# Patient Record
Sex: MALE | Race: WHITE | NOT HISPANIC OR LATINO | Employment: OTHER | URBAN - METROPOLITAN AREA
[De-identification: names, ages, dates, MRNs, and addresses within clinical notes are randomized per-mention and may not be internally consistent; named-entity substitution may affect disease eponyms.]

---

## 2019-03-31 ENCOUNTER — HOSPITAL ENCOUNTER (OUTPATIENT)
Dept: RADIOLOGY | Facility: MEDICAL CENTER | Age: 64
End: 2019-03-31

## 2019-03-31 ENCOUNTER — HOSPITAL ENCOUNTER (OUTPATIENT)
Facility: MEDICAL CENTER | Age: 64
DRG: 247 | End: 2019-03-31
Payer: MEDICARE

## 2019-03-31 ENCOUNTER — HOSPITAL ENCOUNTER (INPATIENT)
Facility: MEDICAL CENTER | Age: 64
LOS: 2 days | DRG: 247 | End: 2019-04-03
Attending: INTERNAL MEDICINE | Admitting: INTERNAL MEDICINE
Payer: MEDICARE

## 2019-03-31 DIAGNOSIS — I21.4 NSTEMI (NON-ST ELEVATED MYOCARDIAL INFARCTION) (HCC): ICD-10-CM

## 2019-03-31 PROBLEM — I10 ESSENTIAL HYPERTENSION: Status: ACTIVE | Noted: 2019-03-31

## 2019-03-31 PROBLEM — E78.5 DYSLIPIDEMIA: Status: ACTIVE | Noted: 2019-03-31

## 2019-03-31 LAB
ERYTHROCYTE [DISTWIDTH] IN BLOOD BY AUTOMATED COUNT: 41.6 FL (ref 35.9–50)
HCT VFR BLD AUTO: 47.5 % (ref 42–52)
HGB BLD-MCNC: 15.1 G/DL (ref 14–18)
MCH RBC QN AUTO: 28.2 PG (ref 27–33)
MCHC RBC AUTO-ENTMCNC: 31.8 G/DL (ref 33.7–35.3)
MCV RBC AUTO: 88.6 FL (ref 81.4–97.8)
PLATELET # BLD AUTO: 249 K/UL (ref 164–446)
PMV BLD AUTO: 8.6 FL (ref 9–12.9)
RBC # BLD AUTO: 5.36 M/UL (ref 4.7–6.1)
TROPONIN I SERPL-MCNC: 1.89 NG/ML (ref 0–0.04)
WBC # BLD AUTO: 7.3 K/UL (ref 4.8–10.8)

## 2019-03-31 PROCEDURE — 99220 PR INITIAL OBSERVATION CARE,LEVL III: CPT | Performed by: HOSPITALIST

## 2019-03-31 PROCEDURE — G0378 HOSPITAL OBSERVATION PER HR: HCPCS

## 2019-03-31 PROCEDURE — 99204 OFFICE O/P NEW MOD 45 MIN: CPT | Performed by: INTERNAL MEDICINE

## 2019-03-31 PROCEDURE — 700102 HCHG RX REV CODE 250 W/ 637 OVERRIDE(OP): Performed by: HOSPITALIST

## 2019-03-31 PROCEDURE — A9270 NON-COVERED ITEM OR SERVICE: HCPCS | Performed by: HOSPITALIST

## 2019-03-31 PROCEDURE — 36415 COLL VENOUS BLD VENIPUNCTURE: CPT

## 2019-03-31 PROCEDURE — 84484 ASSAY OF TROPONIN QUANT: CPT

## 2019-03-31 PROCEDURE — 80048 BASIC METABOLIC PNL TOTAL CA: CPT

## 2019-03-31 PROCEDURE — 85027 COMPLETE CBC AUTOMATED: CPT

## 2019-03-31 RX ORDER — ACETAMINOPHEN 325 MG/1
650 TABLET ORAL EVERY 6 HOURS PRN
Status: DISCONTINUED | OUTPATIENT
Start: 2019-03-31 | End: 2019-04-02

## 2019-03-31 RX ORDER — ASPIRIN 300 MG/1
300 SUPPOSITORY RECTAL DAILY
Status: DISCONTINUED | OUTPATIENT
Start: 2019-04-01 | End: 2019-04-02

## 2019-03-31 RX ORDER — OXYCODONE HYDROCHLORIDE 5 MG/1
5 TABLET ORAL
Status: DISCONTINUED | OUTPATIENT
Start: 2019-03-31 | End: 2019-04-01

## 2019-03-31 RX ORDER — ASPIRIN 81 MG/1
324 TABLET, CHEWABLE ORAL DAILY
Status: DISCONTINUED | OUTPATIENT
Start: 2019-04-01 | End: 2019-04-02

## 2019-03-31 RX ORDER — ENALAPRILAT 1.25 MG/ML
1.25 INJECTION INTRAVENOUS EVERY 6 HOURS PRN
Status: DISCONTINUED | OUTPATIENT
Start: 2019-03-31 | End: 2019-04-03

## 2019-03-31 RX ORDER — ONDANSETRON 4 MG/1
4 TABLET, ORALLY DISINTEGRATING ORAL EVERY 4 HOURS PRN
Status: DISCONTINUED | OUTPATIENT
Start: 2019-03-31 | End: 2019-04-01

## 2019-03-31 RX ORDER — ONDANSETRON 2 MG/ML
4 INJECTION INTRAMUSCULAR; INTRAVENOUS EVERY 4 HOURS PRN
Status: DISCONTINUED | OUTPATIENT
Start: 2019-03-31 | End: 2019-04-01

## 2019-03-31 RX ORDER — NITROGLYCERIN 0.4 MG/1
0.4 TABLET SUBLINGUAL
Status: DISCONTINUED | OUTPATIENT
Start: 2019-03-31 | End: 2019-04-03 | Stop reason: HOSPADM

## 2019-03-31 RX ORDER — BISACODYL 10 MG
10 SUPPOSITORY, RECTAL RECTAL
Status: DISCONTINUED | OUTPATIENT
Start: 2019-03-31 | End: 2019-04-01

## 2019-03-31 RX ORDER — OXYCODONE HYDROCHLORIDE 5 MG/1
2.5 TABLET ORAL
Status: DISCONTINUED | OUTPATIENT
Start: 2019-03-31 | End: 2019-04-01

## 2019-03-31 RX ORDER — HYDROMORPHONE HYDROCHLORIDE 1 MG/ML
0.25 INJECTION, SOLUTION INTRAMUSCULAR; INTRAVENOUS; SUBCUTANEOUS
Status: DISCONTINUED | OUTPATIENT
Start: 2019-03-31 | End: 2019-04-01

## 2019-03-31 RX ORDER — POLYETHYLENE GLYCOL 3350 17 G/17G
1 POWDER, FOR SOLUTION ORAL
Status: DISCONTINUED | OUTPATIENT
Start: 2019-03-31 | End: 2019-04-01

## 2019-03-31 RX ORDER — AMOXICILLIN 250 MG
2 CAPSULE ORAL 2 TIMES DAILY
Status: DISCONTINUED | OUTPATIENT
Start: 2019-03-31 | End: 2019-04-01

## 2019-03-31 RX ORDER — ASPIRIN 325 MG
325 TABLET ORAL DAILY
Status: DISCONTINUED | OUTPATIENT
Start: 2019-04-01 | End: 2019-04-02

## 2019-03-31 RX ORDER — ATORVASTATIN CALCIUM 80 MG/1
80 TABLET, FILM COATED ORAL EVERY EVENING
Status: DISCONTINUED | OUTPATIENT
Start: 2019-03-31 | End: 2019-04-03 | Stop reason: HOSPADM

## 2019-03-31 RX ADMIN — ATORVASTATIN CALCIUM 80 MG: 80 TABLET, FILM COATED ORAL at 22:40

## 2019-03-31 RX ADMIN — METOPROLOL TARTRATE 25 MG: 25 TABLET, FILM COATED ORAL at 22:40

## 2019-03-31 ASSESSMENT — COGNITIVE AND FUNCTIONAL STATUS - GENERAL
SUGGESTED CMS G CODE MODIFIER MOBILITY: CH
SUGGESTED CMS G CODE MODIFIER DAILY ACTIVITY: CH
MOBILITY SCORE: 24
DAILY ACTIVITIY SCORE: 24

## 2019-03-31 ASSESSMENT — PATIENT HEALTH QUESTIONNAIRE - PHQ9
SUM OF ALL RESPONSES TO PHQ9 QUESTIONS 1 AND 2: 0
1. LITTLE INTEREST OR PLEASURE IN DOING THINGS: NOT AT ALL
2. FEELING DOWN, DEPRESSED, IRRITABLE, OR HOPELESS: NOT AT ALL

## 2019-03-31 ASSESSMENT — LIFESTYLE VARIABLES
EVER_SMOKED: NEVER
ALCOHOL_USE: NO

## 2019-03-31 NOTE — PROGRESS NOTES
63-year-old male who presented at outside facility at [facility] with complaints of body aches, nausea and vomiting found to have elevated troponin at 0.45 with apparently normal EKG. Dr Green discussed this patient with Dr. Marie/cardiology, who agreed to consult upon patient's arrival

## 2019-04-01 ENCOUNTER — APPOINTMENT (OUTPATIENT)
Dept: CARDIOLOGY | Facility: MEDICAL CENTER | Age: 64
DRG: 247 | End: 2019-04-01
Attending: PHYSICIAN ASSISTANT
Payer: MEDICARE

## 2019-04-01 LAB
ALBUMIN SERPL BCP-MCNC: 4.1 G/DL (ref 3.2–4.9)
ALBUMIN/GLOB SERPL: 1.5 G/DL
ALP SERPL-CCNC: 69 U/L (ref 30–99)
ALT SERPL-CCNC: 35 U/L (ref 2–50)
ANION GAP SERPL CALC-SCNC: 5 MMOL/L (ref 0–11.9)
ANION GAP SERPL CALC-SCNC: 7 MMOL/L (ref 0–11.9)
APTT PPP: 28 SEC (ref 24.7–36)
AST SERPL-CCNC: 23 U/L (ref 12–45)
BILIRUB SERPL-MCNC: 0.7 MG/DL (ref 0.1–1.5)
BUN SERPL-MCNC: 13 MG/DL (ref 8–22)
BUN SERPL-MCNC: 14 MG/DL (ref 8–22)
CALCIUM SERPL-MCNC: 9.1 MG/DL (ref 8.5–10.5)
CALCIUM SERPL-MCNC: 9.2 MG/DL (ref 8.5–10.5)
CHLORIDE SERPL-SCNC: 106 MMOL/L (ref 96–112)
CHLORIDE SERPL-SCNC: 107 MMOL/L (ref 96–112)
CHOLEST SERPL-MCNC: 212 MG/DL (ref 100–199)
CO2 SERPL-SCNC: 25 MMOL/L (ref 20–33)
CO2 SERPL-SCNC: 26 MMOL/L (ref 20–33)
CREAT SERPL-MCNC: 0.84 MG/DL (ref 0.5–1.4)
CREAT SERPL-MCNC: 0.87 MG/DL (ref 0.5–1.4)
EKG IMPRESSION: NORMAL
ERYTHROCYTE [DISTWIDTH] IN BLOOD BY AUTOMATED COUNT: 41.4 FL (ref 35.9–50)
GLOBULIN SER CALC-MCNC: 2.7 G/DL (ref 1.9–3.5)
GLUCOSE SERPL-MCNC: 102 MG/DL (ref 65–99)
GLUCOSE SERPL-MCNC: 107 MG/DL (ref 65–99)
HCT VFR BLD AUTO: 48.2 % (ref 42–52)
HDLC SERPL-MCNC: 30 MG/DL
HGB BLD-MCNC: 15.9 G/DL (ref 14–18)
INR PPP: 1.03 (ref 0.87–1.13)
LDLC SERPL CALC-MCNC: 158 MG/DL
MCH RBC QN AUTO: 29 PG (ref 27–33)
MCHC RBC AUTO-ENTMCNC: 33 G/DL (ref 33.7–35.3)
MCV RBC AUTO: 88 FL (ref 81.4–97.8)
PLATELET # BLD AUTO: 246 K/UL (ref 164–446)
PMV BLD AUTO: 8.5 FL (ref 9–12.9)
POTASSIUM SERPL-SCNC: 4.1 MMOL/L (ref 3.6–5.5)
POTASSIUM SERPL-SCNC: 4.3 MMOL/L (ref 3.6–5.5)
PROT SERPL-MCNC: 6.8 G/DL (ref 6–8.2)
PROTHROMBIN TIME: 13.6 SEC (ref 12–14.6)
RBC # BLD AUTO: 5.48 M/UL (ref 4.7–6.1)
SODIUM SERPL-SCNC: 138 MMOL/L (ref 135–145)
SODIUM SERPL-SCNC: 138 MMOL/L (ref 135–145)
TRIGL SERPL-MCNC: 120 MG/DL (ref 0–149)
TROPONIN I SERPL-MCNC: 2.52 NG/ML (ref 0–0.04)
WBC # BLD AUTO: 6.7 K/UL (ref 4.8–10.8)

## 2019-04-01 PROCEDURE — 700105 HCHG RX REV CODE 258: Performed by: INTERNAL MEDICINE

## 2019-04-01 PROCEDURE — 700102 HCHG RX REV CODE 250 W/ 637 OVERRIDE(OP): Performed by: HOSPITALIST

## 2019-04-01 PROCEDURE — 700105 HCHG RX REV CODE 258: Performed by: PHYSICIAN ASSISTANT

## 2019-04-01 PROCEDURE — 85730 THROMBOPLASTIN TIME PARTIAL: CPT

## 2019-04-01 PROCEDURE — A9270 NON-COVERED ITEM OR SERVICE: HCPCS | Performed by: HOSPITALIST

## 2019-04-01 PROCEDURE — 770020 HCHG ROOM/CARE - TELE (206)

## 2019-04-01 PROCEDURE — 85027 COMPLETE CBC AUTOMATED: CPT

## 2019-04-01 PROCEDURE — 80053 COMPREHEN METABOLIC PANEL: CPT

## 2019-04-01 PROCEDURE — 36415 COLL VENOUS BLD VENIPUNCTURE: CPT

## 2019-04-01 PROCEDURE — 85610 PROTHROMBIN TIME: CPT

## 2019-04-01 PROCEDURE — 80061 LIPID PANEL: CPT

## 2019-04-01 PROCEDURE — 99233 SBSQ HOSP IP/OBS HIGH 50: CPT | Performed by: HOSPITALIST

## 2019-04-01 PROCEDURE — 93005 ELECTROCARDIOGRAM TRACING: CPT | Performed by: INTERNAL MEDICINE

## 2019-04-01 PROCEDURE — 93010 ELECTROCARDIOGRAM REPORT: CPT | Performed by: INTERNAL MEDICINE

## 2019-04-01 RX ORDER — SODIUM CHLORIDE 9 MG/ML
INJECTION, SOLUTION INTRAVENOUS CONTINUOUS
Status: DISCONTINUED | OUTPATIENT
Start: 2019-04-01 | End: 2019-04-01

## 2019-04-01 RX ORDER — SODIUM CHLORIDE 9 MG/ML
1000 INJECTION, SOLUTION INTRAVENOUS CONTINUOUS
Status: DISCONTINUED | OUTPATIENT
Start: 2019-04-01 | End: 2019-04-02

## 2019-04-01 RX ADMIN — METOPROLOL TARTRATE 25 MG: 25 TABLET, FILM COATED ORAL at 18:09

## 2019-04-01 RX ADMIN — SODIUM CHLORIDE: 9 INJECTION, SOLUTION INTRAVENOUS at 08:10

## 2019-04-01 RX ADMIN — ATORVASTATIN CALCIUM 80 MG: 80 TABLET, FILM COATED ORAL at 18:09

## 2019-04-01 RX ADMIN — METOPROLOL TARTRATE 25 MG: 25 TABLET, FILM COATED ORAL at 06:24

## 2019-04-01 RX ADMIN — SODIUM CHLORIDE 1000 ML: 9 INJECTION, SOLUTION INTRAVENOUS at 21:00

## 2019-04-01 RX ADMIN — ASPIRIN 325 MG: 325 TABLET ORAL at 06:24

## 2019-04-01 ASSESSMENT — ENCOUNTER SYMPTOMS
COUGH: 0
DIAPHORESIS: 0
CHILLS: 0
FEVER: 0
HEADACHES: 0
PALPITATIONS: 0
DIZZINESS: 0

## 2019-04-01 NOTE — H&P
Hospital Medicine History & Physical Note    Date of Service  3/31/2019    Primary Care Physician  No primary care provider on file.    Consultants  Cardiology    Code Status  Full code    Chief Complaint  Nausea and malaise    History of Presenting Illness  63 y.o. male who presented 3/31/2019 with history of dyslipidemia and hypertension which she has been managing with lifestyle changes.  He reports that on March 26 he had an episode of pressure sensation radiating across his chest associated with nausea malaise and tingling sensation in his left arm his blood pressure was elevated when he checked it it it was 180/100.  His symptoms resolved spontaneously and his blood pressure improved so he did not seek medical care.  Over the past couple of days he has not been feeling well with vague malaise this morning his blood pressure was elevated and he had nausea so he got concerned and presented to the emergency room at [facility] in [place] where he was noted to have elevated troponin of 1.47, no acute ischemic changes on his ECG and he was subsequently transferred to our facility.  At the time of my examination the patient denies any chest pain.  He denies any orthopnea his nausea has resolved.  He denies any fever or chills.  He denies any palpitations.  He denies any loss of consciousness.  He denies any diaphoresis.  He has been exercising until his episode on March 26 and was not limited by dyspnea or chest pain.    Review of Systems  Review of Systems   All other systems reviewed and are negative.      Past Medical History  Hypertension, dyslipidemia    Surgical History  Negative per patient    Family History  His father had a heart attack at age 68    Social History  He is a lifetime non-smoker he denies alcohol or illicit drug use    Allergies  Allergies not on file    Medications  None       Physical Exam  Temp:  [36.7 °C (98.1 °F)] 36.7 °C (98.1 °F)  Pulse:  [90] 90  Resp:  [16] 16  BP: (157)/(88)  157/88  SpO2:  [96 %] 96 %    Physical Exam   Constitutional: He is oriented to person, place, and time. He appears well-developed and well-nourished.   HENT:   Head: Normocephalic and atraumatic.   Right Ear: External ear normal.   Left Ear: External ear normal.   Mouth/Throat: No oropharyngeal exudate.   Eyes: Conjunctivae are normal. Right eye exhibits no discharge. Left eye exhibits no discharge. No scleral icterus.   Neck: Neck supple. No JVD present. No tracheal deviation present.   Cardiovascular: Normal rate and regular rhythm.  Exam reveals no gallop and no friction rub.    No murmur heard.  Pulmonary/Chest: Effort normal and breath sounds normal. No stridor. No respiratory distress. He has no wheezes. He has no rales. He exhibits no tenderness.   Abdominal: Soft. Bowel sounds are normal. He exhibits no distension and no mass. There is no tenderness. There is no rebound and no guarding.   Musculoskeletal: He exhibits no edema or tenderness.   Neurological: He is alert and oriented to person, place, and time. No cranial nerve deficit. He exhibits normal muscle tone.   Skin: Skin is warm and dry. He is not diaphoretic. No cyanosis. Nails show no clubbing.   Psychiatric: He has a normal mood and affect. His behavior is normal. Thought content normal.   Nursing note and vitals reviewed.      Laboratory:  WBC 6.3 hemoglobin 15.6 hematocrit 45.4 platelets 241 INR 0.9 sodium 132 potassium 3.6 chloride 108 bicarb 20 calcium 8.1 BUN 17 creatinine 0.89 glucose 95 alk phos 70 AST 32 ALT 41 troponin I 1.47    Urinalysis:    No results found     Imaging:  No orders to display     Chest x-ray done at the outlying facility was negative for acute    Assessment/Plan:  I anticipate this patient will require at least two midnights for appropriate medical management, necessitating inpatient admission.    * NSTEMI (non-ST elevated myocardial infarction) (HCC)   Assessment & Plan      We will trend his troponin  We will monitor  him on telemetry  We will check echocardiogram  I will start him on aspirin atorvastatin and metoprolol  We will hold off on anticoagulation as he is currently symptom-freeI and I suspect his initial event was on the 26  Will await cardiology evaluation and recommendations     Dyslipidemia   Assessment & Plan    Check lipid panel  We will start him on atorvastatin 80 mg     Essential hypertension   Assessment & Plan    Uncontrolled    I will start him on metoprolol given his NSTEMI  We will monitor his blood pressure and consider adding lisinopril if remains elevated       Plan of care reviewed with patient and discussed with nursing staff     VTE prophylaxis: Lovenox

## 2019-04-01 NOTE — PROGRESS NOTES
Hospital Medicine Daily Progress Note    Date of Service  4/1/2019    Chief Complaint  63 y.o. male admitted 3/31/2019 with with chest pain    Hospital Course    63 y.o. male who presented 3/31/2019 with history of dyslipidemia and hypertension which she has been managing with lifestyle changes, coming in with complaints of chest pain since March 26th. Associated with malaise, went to [facility] in [place] where he was noted to have elevated troponin of 1.47, no acute ischemic changes on his ECG and he was subsequently transferred to our facility.          Interval Problem Update  4/1--Seen by Cardiology late last night, NPO @ [facility] for cath today. No CP or SOB. Cr wnl, Trop 2.5 late last night, none new this AM.     Consultants/Specialty  Cardiology    Code Status  FC    Disposition  Inpatient on tele, cath today    Review of Systems  Review of Systems   Constitutional: Negative for chills, diaphoresis and fever.   Respiratory: Negative for cough.    Cardiovascular: Negative for chest pain and palpitations.   Neurological: Negative for dizziness and headaches.   All other systems reviewed and are negative.     Physical Exam  Temp:  [36.3 °C (97.4 °F)-36.9 °C (98.5 °F)] 36.9 °C (98.5 °F)  Pulse:  [79-90] 79  Resp:  [16-18] 18  BP: (153-166)/(87-96) 163/96  SpO2:  [96 %-97 %] 97 %    Physical Exam   Constitutional: He is oriented to person, place, and time. No distress.   HENT:   Head: Normocephalic and atraumatic.   Mouth/Throat: No oropharyngeal exudate.   Eyes: Pupils are equal, round, and reactive to light. No scleral icterus.   Neck: Normal range of motion.   Cardiovascular: Normal rate and normal heart sounds.    Pulmonary/Chest: Effort normal. No stridor. No respiratory distress. He has no wheezes.   Abdominal: Soft. There is no tenderness.   Musculoskeletal: Normal range of motion. He exhibits no tenderness.   Neurological: He is alert and oriented to person, place, and time.   Skin: Skin is warm and dry. No  rash noted. He is not diaphoretic.   Psychiatric: He has a normal mood and affect. His behavior is normal.   Nursing note and vitals reviewed.    Fluids  No intake or output data in the 24 hours ending 04/01/19 1431    Laboratory  Recent Labs      03/31/19 2333 04/01/19   0749   WBC  7.3  6.7   RBC  5.36  5.48   HEMOGLOBIN  15.1  15.9   HEMATOCRIT  47.5  48.2   MCV  88.6  88.0   MCH  28.2  29.0   MCHC  31.8*  33.0*   RDW  41.6  41.4   PLATELETCT  249  246   MPV  8.6*  8.5*     Recent Labs      03/31/19 2333 04/01/19   0748   SODIUM  138  138   POTASSIUM  4.3  4.1   CHLORIDE  107  106   CO2  26  25   GLUCOSE  107*  102*   BUN  14  13   CREATININE  0.87  0.84   CALCIUM  9.2  9.1     Recent Labs      04/01/19   0748   APTT  28.0   INR  1.03         Recent Labs      04/01/19   0209   TRIGLYCERIDE  120   HDL  30*   LDL  158*       Imaging  OUTSIDE IMAGES-DX CHEST   Final Result      CL-LEFT HEART CATHETERIZATION WITH POSSIBLE INTERVENTION    (Results Pending)   EC-ECHOCARDIOGRAM COMPLETE W/O CONT    (Results Pending)        Assessment/Plan  * NSTEMI (non-ST elevated myocardial infarction) (HCC)   Assessment & Plan    Trop peaked at 2.5  Cath lab today  We will monitor him on telemetry  We will check echocardiogram  I will continue him on aspirin atorvastatin and metoprolol     Dyslipidemia   Assessment & Plan     on panel  We will start him on atorvastatin 80 mg     Essential hypertension   Assessment & Plan    Uncontrolled    I will start him on metoprolol given his NSTEMI  We will monitor his blood pressure and consider adding lisinopril if remains elevated          VTE prophylaxis: scds

## 2019-04-01 NOTE — ED NOTES
Med Rec completed per Pt at bedside  Allergies reviewed  No ABX in last 30 days    Pt denies taking any RX's or OTC's

## 2019-04-01 NOTE — CARE PLAN
Problem: Safety  Goal: Will remain free from falls    Intervention: Assess risk factors for falls  Fall precautions in place. Bed in lowest position. Non-skid socks in place. Personal possessions within reach. Mobility sign on door. Call light within reach. Pt educated regarding fall prevention and states understanding.       Problem: Knowledge Deficit  Goal: Knowledge of disease process/condition, treatment plan, diagnostic tests, and medications will improve    Intervention: Explain information regarding disease process/condition, treatment plan, diagnostic tests, and medications and document in education  Pt educated regarding plan of care and medications. All questions answered.

## 2019-04-01 NOTE — CONSULTS
Cardiology Consult Note:    Mart Vann  Date & Time note created:    3/31/2019   11:04 PM     Referring MD:  Dr. Mercer    Patient ID:   Name:             Daniele Randhawa     YOB: 1955  Age:                 63 y.o.  male   MRN:               [id number]                                                             Reason for Consult:      NSTEMI    History of Present Illness:    63-year-old male with a reported past medical history of hypertension and possibly hyperlipidemia.  Approximately 4 days prior to admission he was developing a chest pressure-like sensation across his chest rated a 2-3 out of 10 in intensity without nausea or vomiting.  It lasted for approximately 24 hours and self resolved.  When he checked his blood pressure at home it was in the 180s over 1 his blood pressure then improved and he did not seek any medical care.  This morning he started developing the sensation of nausea malaise and was concerned.  He presented to [facility] in [place] where his troponin was noted be elevated at 1.47 with no changes on his ECG because of this a consultation was obtained where they request transfer.  Currently he is chest pain-free.  His troponin is now gone up to 1.89.  Review of Systems:      Constitutional: Denies fevers, Denies weight changes  Eyes: Denies changes in vision, no eye pain  Ears/Nose/Throat/Mouth: Denies nasal congestion or sore throat   Cardiovascular: + chest pain, no palpitations   Respiratory: no shortness of breath , Denies cough  Gastrointestinal/Hepatic: Denies abdominal pain, nausea, vomiting, diarrhea, constipation or GI bleeding   Genitourinary: Denies dysuria or frequency  Musculoskeletal/Rheum: Denies  joint pain and swelling, noedema  Skin: Denies rash  Neurological: Denies headache, confusion, memory loss or focal weakness/parasthesias  Psychiatric: denies mood disorder   Endocrine: Shannan thyroid problems  Heme/Oncology/Lymph Nodes: Denies  enlarged lymph nodes, denies brusing or known bleeding disorder  All other systems were reviewed and are negative (AMA/CMS criteria)                Past Medical History:   No past medical history on file.  Active Hospital Problems    Diagnosis   • NSTEMI (non-ST elevated myocardial infarction) (HCC) [I21.4]   • Essential hypertension [I10]   • Dyslipidemia [E78.5]       Past Surgical History:  No past surgical history on file.    Hospital Medications:  Current Facility-Administered Medications   Medication Dose   • senna-docusate (PERICOLACE or SENOKOT S) 8.6-50 MG per tablet 2 Tab  2 Tab    And   • polyethylene glycol/lytes (MIRALAX) PACKET 1 Packet  1 Packet    And   • magnesium hydroxide (MILK OF MAGNESIA) suspension 30 mL  30 mL    And   • bisacodyl (DULCOLAX) suppository 10 mg  10 mg   • nitroglycerin (NITROSTAT) tablet 0.4 mg  0.4 mg   • metoprolol (LOPRESSOR) tablet 25 mg  25 mg   • atorvastatin (LIPITOR) tablet 80 mg  80 mg   • [START ON 4/1/2019] aspirin (ASA) tablet 325 mg  325 mg    Or   • [START ON 4/1/2019] aspirin (ASA) chewable tab 324 mg  324 mg    Or   • [START ON 4/1/2019] aspirin (ASA) suppository 300 mg  300 mg   • [START ON 4/1/2019] enoxaparin (LOVENOX) inj 40 mg  40 mg   • acetaminophen (TYLENOL) tablet 650 mg  650 mg   • Pharmacy Consult Request ...Pain Management Review 1 Each  1 Each    And   • oxyCODONE immediate-release (ROXICODONE) tablet 2.5 mg  2.5 mg    And   • oxyCODONE immediate-release (ROXICODONE) tablet 5 mg  5 mg    And   • HYDROmorphone pf (DILAUDID) injection 0.25 mg  0.25 mg   • enalaprilat (VASOTEC) injection 1.25 mg  1.25 mg   • ondansetron (ZOFRAN) syringe/vial injection 4 mg  4 mg   • ondansetron (ZOFRAN ODT) dispertab 4 mg  4 mg         Current Outpatient Medications:  No prescriptions prior to admission.       Medication Allergy:  Allergies no known allergies    Family History:  No family history on file.    Social History:  Social History     Social History   • Marital  "status:      Spouse name: N/A   • Number of children: N/A   • Years of education: N/A     Occupational History   • Not on file.     Social History Main Topics   • Smoking status: Not on file   • Smokeless tobacco: Not on file   • Alcohol use Not on file   • Drug use: Unknown   • Sexual activity: Not on file     Other Topics Concern   • Not on file     Social History Narrative   • No narrative on file         Physical Exam:  Vitals/ General Appearance:   Weight/BMI: Body mass index is 28.03 kg/m².  Blood pressure (!) 166/87, pulse 85, temperature 36.7 °C (98 °F), temperature source Temporal, resp. rate 16, height 1.803 m (5' 11\"), weight 91.2 kg (201 lb), SpO2 96 %.  Vitals:    03/31/19 1813 03/31/19 2102 03/31/19 2247   BP: 157/88 (!) 166/87    Pulse: 90 85    Resp: 16 16    Temp: 36.7 °C (98.1 °F) 36.7 °C (98 °F)    TempSrc: Temporal Temporal    SpO2: 96% 96%    Weight: 91.2 kg (201 lb)     Height:   1.803 m (5' 11\")     Oxygen Therapy:  Pulse Oximetry: 96 %, O2 (LPM): 0, O2 Delivery: None (Room Air)    Constitutional:   Well developed, Well nourished, No acute distress  HENMT:  Normocephalic, Atraumatic, Oropharynx moist mucous membranes, No oral exudates, Nose normal.  No thyromegaly.  Eyes:  EOMI, Conjunctiva normal, No discharge.  Neck:  Normal range of motion, No cervical tenderness,  no JVD.  Cardiovascular:  Normal heart rate, Normal rhythm, No murmurs, No rubs, No gallops.   Extremitites with intact distal pulses, no cyanosis, or edema.  Lungs:  Normal breath sounds, breath sounds clear to auscultation bilaterally,  no crackles, no wheezing.   Abdomen: Bowel sounds normal, Soft, No tenderness, No guarding, No rebound, No masses, No hepatosplenomegaly.  Skin: Warm, Dry, No erythema, No rash, no induration.  Neurologic: Alert & oriented x 3, No focal deficits noted, cranial nerves II through X are grossly intact.  Psychiatric: Affect normal, Judgment normal, Mood normal.      MDM (Data Review):   "   Records reviewed and summarized in current documentation    Lab Data Review:  Recent Results (from the past 24 hour(s))   Troponin - STAT Once    Collection Time: 03/31/19  7:21 PM   Result Value Ref Range    Troponin I 1.89 (H) 0.00 - 0.04 ng/mL       Imaging/Procedures Review:    Chest Xray:  Reviewed    EKG:   Not available within our system    ECHO:  Pending    MDM (Assessment and Plan):     Active Hospital Problems    Diagnosis   • NSTEMI (non-ST elevated myocardial infarction) (HCC) [I21.4]   • Essential hypertension [I10]   • Dyslipidemia [E78.5]     63-year-old male with dyslipidemia hypertension and a possible N STEMI.  He will be n.p.o. after midnight for cardiac catheterization in the morning.  We will check fasting lipids.  I agree with metoprolol and Nitropaste.  Because I do not have any labs in our system I will hold off starting an ACE inhibitor or an ARB.    Thank for you allowing me to take part in your patient's care, please call should you have any questions or would like to discuss this patient.

## 2019-04-01 NOTE — PROGRESS NOTES
See Coumadin tracking flowsheet for documentation.   Transfer from [facility] and troponin of 1.47. NSTEMI diagnosis from hospital medicine and cardiology. Angiogram today per notes.    Echocardiogram ordered. For reference below are the defect free care measures that will be required should patient continue with an ACS diagnosis after angiogram.    Charlotte MELO RN, [facility] ext. [phone] M-F    ACS Measures:  1. ASA prescribed at discharge  2. Beta blockade prescribed at discharge, if patient also has HFrEF (EF less than or equal to 40%), this needs to be one of the three evidence based beta blockers: carvedilol, bisoprolol, Toprol XL  3. High intensity statin prescribed at discharge (atorvastatin 40 mg or rosuvastatin 20 mg)  4. ACE-I or ARB prescribed on discharge for LVEF less than 40%  5. Aldosterone blockade prescribed for patients with EF less than 40% AND history of diabetes mellitus OR history of heart failure, heart failure on presentation or heart failure as an in-hospital event  6. ICR referral order is placed  7. Use the Acute Coronary Syndrome discharge instructions to document that patient has been provided with the contact information for ICR   8. Evaluation of LV systolic function can be by angiogram, or echo before discharge, or within past year, cannot be a future plan for LVSF assessment  9. ACS education is documented daily  1. For anyone who has had PCI, initiate Meds to Beds: Monday through Friday 9-5 call [phone]. All other times, call [phone] and ask to page the on-call [facility] pharmacist.  10. Smoking cessation counseling    What if any of the above ACS Measures are contraindicated?  ? Request that the discharging provider document the medication/intervention and the contraindication specifically in a progress note  ? For example: “no ACE-I meds due to hypotension” is not enough. It needs to say: “No ACE-I, ARNI, ARB due to hypotension”; “No Beta Blockade due to bradycardia”…   ,

## 2019-04-01 NOTE — THERAPY
Holding PT phase I cardiac rehab eval until further workup (cath). Will attempt once medical/surgical POC has been established.

## 2019-04-01 NOTE — PROGRESS NOTES
Pt arrived to unit via gurney as direct admit from [facility] at 1815.  Pt A&O x4, denies chest pain, denies nausea.  Pt oriented to room, unit, and plan of care. Tele-monitor placed and monitor room notified. All questions answered at this time. Call light within reach; fall precautions in place. Admitting notified and doctor paged to bedside.

## 2019-04-02 ENCOUNTER — APPOINTMENT (OUTPATIENT)
Dept: CARDIOLOGY | Facility: MEDICAL CENTER | Age: 64
DRG: 247 | End: 2019-04-02
Attending: PHYSICIAN ASSISTANT
Payer: MEDICARE

## 2019-04-02 LAB
LV EJECT FRACT  99904: 55
LV EJECT FRACT MOD 2C 99903: 51.43
LV EJECT FRACT MOD 4C 99902: 60.01
LV EJECT FRACT MOD BP 99901: 53.95

## 2019-04-02 PROCEDURE — 700111 HCHG RX REV CODE 636 W/ 250 OVERRIDE (IP)

## 2019-04-02 PROCEDURE — 93458 L HRT ARTERY/VENTRICLE ANGIO: CPT | Mod: 26,59 | Performed by: INTERNAL MEDICINE

## 2019-04-02 PROCEDURE — 99152 MOD SED SAME PHYS/QHP 5/>YRS: CPT | Performed by: INTERNAL MEDICINE

## 2019-04-02 PROCEDURE — 027034Z DILATION OF CORONARY ARTERY, ONE ARTERY WITH DRUG-ELUTING INTRALUMINAL DEVICE, PERCUTANEOUS APPROACH: ICD-10-PCS | Performed by: INTERNAL MEDICINE

## 2019-04-02 PROCEDURE — 93005 ELECTROCARDIOGRAM TRACING: CPT | Performed by: INTERNAL MEDICINE

## 2019-04-02 PROCEDURE — 93306 TTE W/DOPPLER COMPLETE: CPT | Mod: 26 | Performed by: INTERNAL MEDICINE

## 2019-04-02 PROCEDURE — 700105 HCHG RX REV CODE 258: Performed by: PHYSICIAN ASSISTANT

## 2019-04-02 PROCEDURE — 700101 HCHG RX REV CODE 250

## 2019-04-02 PROCEDURE — 99232 SBSQ HOSP IP/OBS MODERATE 35: CPT | Performed by: HOSPITALIST

## 2019-04-02 PROCEDURE — 92928 PRQ TCAT PLMT NTRAC ST 1 LES: CPT | Mod: LC | Performed by: INTERNAL MEDICINE

## 2019-04-02 PROCEDURE — 700102 HCHG RX REV CODE 250 W/ 637 OVERRIDE(OP): Performed by: HOSPITALIST

## 2019-04-02 PROCEDURE — 99153 MOD SED SAME PHYS/QHP EA: CPT

## 2019-04-02 PROCEDURE — 700117 HCHG RX CONTRAST REV CODE 255: Performed by: INTERNAL MEDICINE

## 2019-04-02 PROCEDURE — 700102 HCHG RX REV CODE 250 W/ 637 OVERRIDE(OP)

## 2019-04-02 PROCEDURE — 93306 TTE W/DOPPLER COMPLETE: CPT

## 2019-04-02 PROCEDURE — 770020 HCHG ROOM/CARE - TELE (206)

## 2019-04-02 PROCEDURE — B2111ZZ FLUOROSCOPY OF MULTIPLE CORONARY ARTERIES USING LOW OSMOLAR CONTRAST: ICD-10-PCS | Performed by: INTERNAL MEDICINE

## 2019-04-02 PROCEDURE — B2151ZZ FLUOROSCOPY OF LEFT HEART USING LOW OSMOLAR CONTRAST: ICD-10-PCS | Performed by: INTERNAL MEDICINE

## 2019-04-02 PROCEDURE — A9270 NON-COVERED ITEM OR SERVICE: HCPCS

## 2019-04-02 PROCEDURE — A9270 NON-COVERED ITEM OR SERVICE: HCPCS | Performed by: HOSPITALIST

## 2019-04-02 PROCEDURE — 4A023N7 MEASUREMENT OF CARDIAC SAMPLING AND PRESSURE, LEFT HEART, PERCUTANEOUS APPROACH: ICD-10-PCS | Performed by: INTERNAL MEDICINE

## 2019-04-02 PROCEDURE — 93010 ELECTROCARDIOGRAM REPORT: CPT | Performed by: INTERNAL MEDICINE

## 2019-04-02 RX ORDER — PRASUGREL 10 MG/1
10 TABLET, FILM COATED ORAL DAILY
Status: DISCONTINUED | OUTPATIENT
Start: 2019-04-03 | End: 2019-04-03 | Stop reason: HOSPADM

## 2019-04-02 RX ORDER — MIDAZOLAM HYDROCHLORIDE 1 MG/ML
INJECTION INTRAMUSCULAR; INTRAVENOUS
Status: COMPLETED
Start: 2019-04-02 | End: 2019-04-02

## 2019-04-02 RX ORDER — LIDOCAINE HYDROCHLORIDE 20 MG/ML
INJECTION, SOLUTION INFILTRATION; PERINEURAL
Status: COMPLETED
Start: 2019-04-02 | End: 2019-04-02

## 2019-04-02 RX ORDER — ACETAMINOPHEN 325 MG/1
650 TABLET ORAL EVERY 6 HOURS PRN
Status: DISCONTINUED | OUTPATIENT
Start: 2019-04-02 | End: 2019-04-03 | Stop reason: HOSPADM

## 2019-04-02 RX ORDER — SODIUM CHLORIDE 9 MG/ML
INJECTION, SOLUTION INTRAVENOUS CONTINUOUS
Status: ACTIVE | OUTPATIENT
Start: 2019-04-02 | End: 2019-04-02

## 2019-04-02 RX ORDER — HEPARIN SODIUM,PORCINE 1000/ML
VIAL (ML) INJECTION
Status: COMPLETED
Start: 2019-04-02 | End: 2019-04-02

## 2019-04-02 RX ORDER — PRASUGREL 10 MG/1
TABLET, FILM COATED ORAL
Status: COMPLETED
Start: 2019-04-02 | End: 2019-04-02

## 2019-04-02 RX ORDER — VERAPAMIL HYDROCHLORIDE 2.5 MG/ML
INJECTION, SOLUTION INTRAVENOUS
Status: COMPLETED
Start: 2019-04-02 | End: 2019-04-02

## 2019-04-02 RX ORDER — PRASUGREL 10 MG/1
60 TABLET, FILM COATED ORAL ONCE
Status: DISCONTINUED | OUTPATIENT
Start: 2019-04-02 | End: 2019-04-02

## 2019-04-02 RX ADMIN — HEPARIN SODIUM: 1000 INJECTION INTRAVENOUS; SUBCUTANEOUS at 15:35

## 2019-04-02 RX ADMIN — LIDOCAINE HYDROCHLORIDE: 20 INJECTION, SOLUTION INFILTRATION; PERINEURAL at 15:35

## 2019-04-02 RX ADMIN — MIDAZOLAM HYDROCHLORIDE 1 MG: 1 INJECTION, SOLUTION INTRAMUSCULAR; INTRAVENOUS at 16:35

## 2019-04-02 RX ADMIN — ASPIRIN 325 MG: 325 TABLET ORAL at 05:42

## 2019-04-02 RX ADMIN — SODIUM CHLORIDE 1000 ML: 9 INJECTION, SOLUTION INTRAVENOUS at 08:41

## 2019-04-02 RX ADMIN — ATORVASTATIN CALCIUM 80 MG: 80 TABLET, FILM COATED ORAL at 17:19

## 2019-04-02 RX ADMIN — MIDAZOLAM HYDROCHLORIDE 2 MG: 1 INJECTION, SOLUTION INTRAMUSCULAR; INTRAVENOUS at 16:30

## 2019-04-02 RX ADMIN — METOPROLOL TARTRATE 25 MG: 25 TABLET, FILM COATED ORAL at 05:42

## 2019-04-02 RX ADMIN — VERAPAMIL HYDROCHLORIDE 2.5 MG: 2.5 INJECTION, SOLUTION INTRAVENOUS at 15:35

## 2019-04-02 RX ADMIN — BIVALIRUDIN IN 0.9% SODIUM CHLORIDE INJECTION: 250 INJECTION INTRAVENOUS at 16:30

## 2019-04-02 RX ADMIN — METOPROLOL TARTRATE 25 MG: 25 TABLET, FILM COATED ORAL at 17:19

## 2019-04-02 RX ADMIN — PRASUGREL 60 MG: 10 TABLET, FILM COATED ORAL at 16:40

## 2019-04-02 RX ADMIN — HEPARIN SODIUM 2000 UNITS: 1000 INJECTION, SOLUTION INTRAVENOUS; SUBCUTANEOUS at 15:36

## 2019-04-02 RX ADMIN — FENTANYL CITRATE 25 MCG: 50 INJECTION INTRAMUSCULAR; INTRAVENOUS at 16:35

## 2019-04-02 RX ADMIN — NITROGLYCERIN 10 ML: 20 INJECTION INTRAVENOUS at 15:35

## 2019-04-02 RX ADMIN — IOHEXOL 85 ML: 350 INJECTION, SOLUTION INTRAVENOUS at 16:39

## 2019-04-02 RX ADMIN — FENTANYL CITRATE 100 MCG: 50 INJECTION INTRAMUSCULAR; INTRAVENOUS at 16:30

## 2019-04-02 ASSESSMENT — ENCOUNTER SYMPTOMS
MYALGIAS: 0
DIAPHORESIS: 0
CARDIOVASCULAR NEGATIVE: 1
PALPITATIONS: 0
DEPRESSION: 0
VOMITING: 0
SORE THROAT: 0
HEADACHES: 0
BRUISES/BLEEDS EASILY: 0
EYES NEGATIVE: 1
RESPIRATORY NEGATIVE: 1
NEUROLOGICAL NEGATIVE: 1
CONSTITUTIONAL NEGATIVE: 1
BLURRED VISION: 0
WEIGHT LOSS: 0
ABDOMINAL PAIN: 0
CHILLS: 0
MUSCULOSKELETAL NEGATIVE: 1
FOCAL WEAKNESS: 0
COUGH: 0
SHORTNESS OF BREATH: 0
PSYCHIATRIC NEGATIVE: 1
GASTROINTESTINAL NEGATIVE: 1
WEAKNESS: 0
FEVER: 0
NAUSEA: 0
DIZZINESS: 0

## 2019-04-02 ASSESSMENT — LIFESTYLE VARIABLES: SUBSTANCE_ABUSE: 0

## 2019-04-02 NOTE — PROGRESS NOTES
Assumed care of pt.  Assessment complete.  A&O x 4.  No signs of distress.  Pt denies any pain.  Discussed plan of care.  Call light within reach.  Bed alarm active.  Treaded socks on pt.  Will continue to monitor.

## 2019-04-02 NOTE — PROGRESS NOTES
[facility] Medicine Daily Progress Note    Date of Service  4/2/2019    Chief Complaint  63 y.o. male admitted 3/31/2019 with with chest pain    Hospital Course    Patient is a pleasant 63 year old male with history of dyslipidemia and hypertension which she has been managing with lifestyle changes, who presented with complaints of chest pain which began on March 26th. Associated with malaise, he went to [facility] in [place] where he was noted to have elevated troponin of 1.47, no acute ischemic changes on his ECG and he was subsequently transferred to our facility.          Interval Problem Update  Doing well, states he has had no chest pain since admission  No sob, ros otherwise negative  axox3  Awaiting cath    Consultants/Specialty  Cardiology    Code Status  Full code    Disposition  Continue tele    Review of Systems  Review of Systems   Constitutional: Negative.  Negative for chills, diaphoresis, fever, malaise/fatigue and weight loss.   HENT: Negative.  Negative for sore throat.    Eyes: Negative.  Negative for blurred vision.   Respiratory: Negative.  Negative for cough and shortness of breath.    Cardiovascular: Negative.  Negative for chest pain, palpitations and leg swelling.   Gastrointestinal: Negative.  Negative for abdominal pain, nausea and vomiting.   Genitourinary: Negative.  Negative for dysuria.   Musculoskeletal: Negative.  Negative for myalgias.   Skin: Negative.  Negative for itching and rash.   Neurological: Negative.  Negative for dizziness, focal weakness, weakness and headaches.   Endo/Heme/Allergies: Negative.  Does not bruise/bleed easily.   Psychiatric/Behavioral: Negative.  Negative for depression, substance abuse and suicidal ideas.   All other systems reviewed and are negative.     Physical Exam  Temp:  [36.3 °C (97.4 °F)-36.8 °C (98.3 °F)] 36.4 °C (97.6 °F)  Pulse:  [64-80] 74  Resp:  [16-18] 16  BP: (127-151)/(74-90) 149/85  SpO2:  [95 %-98 %] 96 %    Physical Exam    Constitutional: He is oriented to person, place, and time. He appears well-developed and well-nourished. No distress.   HENT:   Head: Normocephalic and atraumatic.   Mouth/Throat: Oropharynx is clear and moist. No oropharyngeal exudate.   Eyes: Pupils are equal, round, and reactive to light. Conjunctivae are normal. Right eye exhibits no discharge. No scleral icterus.   Neck: Normal range of motion.   Cardiovascular: Normal rate, regular rhythm, normal heart sounds and intact distal pulses.  Exam reveals no gallop and no friction rub.    No murmur heard.  Pulmonary/Chest: Effort normal and breath sounds normal. No stridor. No respiratory distress. He has no wheezes. He has no rales. He exhibits no tenderness.   Abdominal: Soft. Bowel sounds are normal. He exhibits no distension and no mass. There is no tenderness. There is no rebound and no guarding.   Musculoskeletal: Normal range of motion. He exhibits no edema, tenderness or deformity.   Neurological: He is alert and oriented to person, place, and time. He has normal reflexes. No cranial nerve deficit. He exhibits normal muscle tone. Coordination normal.   Skin: Skin is warm and dry. No rash noted. He is not diaphoretic. No erythema. No pallor.   Psychiatric: He has a normal mood and affect. His behavior is normal. Judgment and thought content normal.   Nursing note and vitals reviewed.    Fluids  No intake or output data in the 24 hours ending 04/02/19 1450    Laboratory  Recent Labs      03/31/19 2333 04/01/19   0749   WBC  7.3  6.7   RBC  5.36  5.48   HEMOGLOBIN  15.1  15.9   HEMATOCRIT  47.5  48.2   MCV  88.6  88.0   MCH  28.2  29.0   MCHC  31.8*  33.0*   RDW  41.6  41.4   PLATELETCT  249  246   MPV  8.6*  8.5*     Recent Labs      03/31/19   2333  04/01/19   0748   SODIUM  138  138   POTASSIUM  4.3  4.1   CHLORIDE  107  106   CO2  26  25   GLUCOSE  107*  102*   BUN  14  13   CREATININE  0.87  0.84   CALCIUM  9.2  9.1     Recent Labs      04/01/19   0748    APTT  28.0   INR  1.03         Recent Labs      04/01/19   0209   TRIGLYCERIDE  120   HDL  30*   LDL  158*       Imaging  EC-ECHOCARDIOGRAM COMPLETE W/O CONT   Final Result      OUTSIDE IMAGES-DX CHEST   Final Result      CL-LEFT HEART CATHETERIZATION WITH POSSIBLE INTERVENTION    (Results Pending)        Assessment/Plan  * NSTEMI (non-ST elevated myocardial infarction) (HCC)   Assessment & Plan    Trop peaked at 2.5  Cath lab today  We will monitor him on telemetry  We will check echocardiogram  I will continue him on aspirin atorvastatin and metoprolol     Dyslipidemia   Assessment & Plan     on panel  We will start him on atorvastatin 80 mg     Essential hypertension   Assessment & Plan    Uncontrolled    I will start him on metoprolol given his NSTEMI  We will monitor his blood pressure and consider adding lisinopril if remains elevated          VTE prophylaxis: scds

## 2019-04-02 NOTE — PROGRESS NOTES
Brief Note    HPI: 62yo male with history of hypertension, treated with low sodium diet,  presented to [facility] with chest pain, nausea, transferred for minimally elevated troponins, no EKG changes.     Interim events: no chest pain since arrival, still having nausea, improved with food, no meds tried, improved with sitting up although denies heartburn, sour taste in mouth. No vomiting.     On exam: NAD, no carotid bruits, RRR, lungs CTA no rales, no abdominal tenderness, nausea/discomfort is not reproduced with pressure, no LE edema.     Plan:   NPO for cath today (not performed 4/1/19). He understands plan and agreeable to DAPT for 1 year post PCI if applicable.   Continue Aspirin 81mg, lipitor 80mg, metoprolol 25mg BID

## 2019-04-02 NOTE — CARE PLAN
Problem: Communication  Goal: The ability to communicate needs accurately and effectively will improve  Outcome: PROGRESSING AS EXPECTED  Communication board updated. All pt questions answered at this time and no further questions. Pt encouraged to voice feelings and ask questions as they arise.       Problem: Safety  Goal: Will remain free from injury  Outcome: PROGRESSING AS EXPECTED  Patient educated about risk of falls and reasoning for use of tread socks, calling for help, and bed alarms.

## 2019-04-03 VITALS
HEART RATE: 82 BPM | DIASTOLIC BLOOD PRESSURE: 79 MMHG | TEMPERATURE: 97.9 F | RESPIRATION RATE: 16 BRPM | WEIGHT: 214.95 LBS | BODY MASS INDEX: 30.09 KG/M2 | OXYGEN SATURATION: 99 % | SYSTOLIC BLOOD PRESSURE: 151 MMHG | HEIGHT: 71 IN

## 2019-04-03 PROBLEM — I21.4 NSTEMI (NON-ST ELEVATED MYOCARDIAL INFARCTION) (HCC): Status: RESOLVED | Noted: 2019-03-31 | Resolved: 2019-04-03

## 2019-04-03 LAB
ANION GAP SERPL CALC-SCNC: 6 MMOL/L (ref 0–11.9)
BUN SERPL-MCNC: 16 MG/DL (ref 8–22)
CALCIUM SERPL-MCNC: 8.9 MG/DL (ref 8.5–10.5)
CHLORIDE SERPL-SCNC: 106 MMOL/L (ref 96–112)
CO2 SERPL-SCNC: 24 MMOL/L (ref 20–33)
CREAT SERPL-MCNC: 1.03 MG/DL (ref 0.5–1.4)
EKG IMPRESSION: NORMAL
ERYTHROCYTE [DISTWIDTH] IN BLOOD BY AUTOMATED COUNT: 40.1 FL (ref 35.9–50)
EST. AVERAGE GLUCOSE BLD GHB EST-MCNC: 123 MG/DL
GLUCOSE SERPL-MCNC: 89 MG/DL (ref 65–99)
HBA1C MFR BLD: 5.9 % (ref 0–5.6)
HCT VFR BLD AUTO: 47.5 % (ref 42–52)
HGB BLD-MCNC: 15.4 G/DL (ref 14–18)
MCH RBC QN AUTO: 28.4 PG (ref 27–33)
MCHC RBC AUTO-ENTMCNC: 32.4 G/DL (ref 33.7–35.3)
MCV RBC AUTO: 87.5 FL (ref 81.4–97.8)
PLATELET # BLD AUTO: 249 K/UL (ref 164–446)
PMV BLD AUTO: 8.4 FL (ref 9–12.9)
POTASSIUM SERPL-SCNC: 3.9 MMOL/L (ref 3.6–5.5)
RBC # BLD AUTO: 5.43 M/UL (ref 4.7–6.1)
SODIUM SERPL-SCNC: 136 MMOL/L (ref 135–145)
WBC # BLD AUTO: 8.5 K/UL (ref 4.8–10.8)

## 2019-04-03 PROCEDURE — 36415 COLL VENOUS BLD VENIPUNCTURE: CPT

## 2019-04-03 PROCEDURE — A9270 NON-COVERED ITEM OR SERVICE: HCPCS | Performed by: INTERNAL MEDICINE

## 2019-04-03 PROCEDURE — 700102 HCHG RX REV CODE 250 W/ 637 OVERRIDE(OP): Performed by: PHYSICIAN ASSISTANT

## 2019-04-03 PROCEDURE — 99232 SBSQ HOSP IP/OBS MODERATE 35: CPT | Performed by: INTERNAL MEDICINE

## 2019-04-03 PROCEDURE — A9270 NON-COVERED ITEM OR SERVICE: HCPCS | Performed by: PHYSICIAN ASSISTANT

## 2019-04-03 PROCEDURE — 80048 BASIC METABOLIC PNL TOTAL CA: CPT

## 2019-04-03 PROCEDURE — 99239 HOSP IP/OBS DSCHRG MGMT >30: CPT | Performed by: HOSPITALIST

## 2019-04-03 PROCEDURE — 85027 COMPLETE CBC AUTOMATED: CPT

## 2019-04-03 PROCEDURE — 700102 HCHG RX REV CODE 250 W/ 637 OVERRIDE(OP): Performed by: INTERNAL MEDICINE

## 2019-04-03 PROCEDURE — 83036 HEMOGLOBIN GLYCOSYLATED A1C: CPT

## 2019-04-03 PROCEDURE — 700111 HCHG RX REV CODE 636 W/ 250 OVERRIDE (IP): Performed by: HOSPITALIST

## 2019-04-03 PROCEDURE — 97161 PT EVAL LOW COMPLEX 20 MIN: CPT

## 2019-04-03 PROCEDURE — 700102 HCHG RX REV CODE 250 W/ 637 OVERRIDE(OP): Performed by: HOSPITALIST

## 2019-04-03 PROCEDURE — A9270 NON-COVERED ITEM OR SERVICE: HCPCS | Performed by: HOSPITALIST

## 2019-04-03 RX ORDER — ATORVASTATIN CALCIUM 80 MG/1
80 TABLET, FILM COATED ORAL EVERY EVENING
Qty: 30 TAB | Refills: 0 | Status: SHIPPED | OUTPATIENT
Start: 2019-04-03

## 2019-04-03 RX ORDER — AMLODIPINE BESYLATE 5 MG/1
5 TABLET ORAL DAILY
Qty: 30 TAB | Refills: 0 | Status: SHIPPED | OUTPATIENT
Start: 2019-04-03

## 2019-04-03 RX ORDER — AMLODIPINE BESYLATE 5 MG/1
5 TABLET ORAL
Status: DISCONTINUED | OUTPATIENT
Start: 2019-04-03 | End: 2019-04-03

## 2019-04-03 RX ORDER — AMLODIPINE BESYLATE 5 MG/1
5 TABLET ORAL
Status: DISCONTINUED | OUTPATIENT
Start: 2019-04-03 | End: 2019-04-03 | Stop reason: HOSPADM

## 2019-04-03 RX ORDER — LISINOPRIL 5 MG/1
5 TABLET ORAL DAILY
Qty: 30 TAB | Refills: 0 | Status: SHIPPED | OUTPATIENT
Start: 2019-04-03

## 2019-04-03 RX ORDER — ASPIRIN 81 MG/1
81 TABLET ORAL DAILY
Qty: 30 TAB | Refills: 0 | Status: SHIPPED | OUTPATIENT
Start: 2019-04-04

## 2019-04-03 RX ORDER — PRASUGREL 10 MG/1
10 TABLET, FILM COATED ORAL DAILY
Qty: 30 TAB | Refills: 0 | Status: SHIPPED | OUTPATIENT
Start: 2019-04-04 | End: 2019-04-03

## 2019-04-03 RX ORDER — LISINOPRIL 5 MG/1
5 TABLET ORAL
Status: DISCONTINUED | OUTPATIENT
Start: 2019-04-03 | End: 2019-04-03 | Stop reason: HOSPADM

## 2019-04-03 RX ORDER — PRASUGREL 10 MG/1
10 TABLET, FILM COATED ORAL DAILY
Qty: 30 TAB | Refills: 11 | Status: SHIPPED | OUTPATIENT
Start: 2019-04-04

## 2019-04-03 RX ADMIN — ASPIRIN 81 MG: 81 TABLET ORAL at 06:22

## 2019-04-03 RX ADMIN — AMLODIPINE BESYLATE 5 MG: 5 TABLET ORAL at 16:34

## 2019-04-03 RX ADMIN — ENOXAPARIN SODIUM 40 MG: 100 INJECTION SUBCUTANEOUS at 06:22

## 2019-04-03 RX ADMIN — METOPROLOL TARTRATE 25 MG: 25 TABLET, FILM COATED ORAL at 06:22

## 2019-04-03 RX ADMIN — PRASUGREL 10 MG: 10 TABLET, FILM COATED ORAL at 06:29

## 2019-04-03 ASSESSMENT — COGNITIVE AND FUNCTIONAL STATUS - GENERAL
SUGGESTED CMS G CODE MODIFIER MOBILITY: CH
MOBILITY SCORE: 24

## 2019-04-03 ASSESSMENT — ENCOUNTER SYMPTOMS
FEVER: 0
CHILLS: 0
PALPITATIONS: 0
CHEST TIGHTNESS: 0
WOUND: 0
ABDOMINAL DISTENTION: 0
ABDOMINAL PAIN: 0
DIAPHORESIS: 0
LIGHT-HEADEDNESS: 0
ARTHRALGIAS: 0
DIZZINESS: 0
SHORTNESS OF BREATH: 0

## 2019-04-03 ASSESSMENT — GAIT ASSESSMENTS
GAIT LEVEL OF ASSIST: INDEPENDENT
DISTANCE (FEET): 500

## 2019-04-03 NOTE — CARE PLAN
Problem: Communication  Goal: The ability to communicate needs accurately and effectively will improve  Outcome: PROGRESSING AS EXPECTED  Pt. Is oriented to call light system and calls when assistance is needed. Pt. Updated on plan of care. Pt. Encouraged to communicate any needs or concerns. Questions have been addressed.     Problem: Discharge Barriers/Planning  Goal: Patient's continuum of care needs will be met  Outcome: PROGRESSING AS EXPECTED  Discussed discharge plan with pt

## 2019-04-03 NOTE — DISCHARGE PLANNING
RN CM called [facility] pharmacy for co-pay amount and possible prior auth.    Cost without insurance is $47.78     [facility] needs pt insurance information and will need to get the card from pt upon arrival to  medications

## 2019-04-03 NOTE — PROGRESS NOTES
Cardiology Follow Up Progress Note    Date of Service  4/3/2019    Attending Physician  Deanne Lester M.D.    Chief Complaint   STEMI    HPI  Daniele Randhawa is a 63 y.o. male with history of hypertension controlled with diet, hyperlipidemia, untreated, presented to [facility] with nausea, chest pressure, intermittent uncontrolled hypertension, found to have elevated troponins, no ECG changes. He was transferred 3/31/2019 to [facility] for higher level of care.     Interim Events  No overnight events. Cath yesterday, BROOKE to LCx, 70-80%. Ambulating around pod well without symptoms.    SR 64-77 on monitor  Review of Systems  Review of Systems   Constitutional: Negative for chills, diaphoresis and fever.   Respiratory: Negative for chest tightness and shortness of breath.    Cardiovascular: Negative for chest pain, palpitations and leg swelling.   Gastrointestinal: Negative for abdominal distention and abdominal pain.   Musculoskeletal: Negative for arthralgias.   Skin: Negative for wound.   Neurological: Negative for dizziness and light-headedness.       Vital signs in last 24 hours  Temp:  [36.2 °C (97.1 °F)-36.6 °C (97.9 °F)] 36.2 °C (97.1 °F)  Pulse:  [64-80] 66  Resp:  [16-18] 18  BP: (142-151)/(81-90) 151/81  SpO2:  [94 %-98 %] 95 %    Physical Exam  Physical Exam   Constitutional: He is oriented to person, place, and time. He appears well-developed and well-nourished. No distress.   HENT:   Head: Normocephalic and atraumatic.   Mouth/Throat: Oropharynx is clear and moist.   Eyes: Conjunctivae are normal. No scleral icterus.   Neck: Neck supple. No JVD present.   Cardiovascular: Normal rate and regular rhythm.    No murmur heard.  Pulses:       Radial pulses are 2+ on the right side, and 2+ on the left side.        Dorsalis pedis pulses are 2+ on the right side, and 2+ on the left side.   Pulmonary/Chest: Effort normal and breath sounds normal. No respiratory distress. He has no rales.   Abdominal: Soft. Bowel  sounds are normal. He exhibits no distension. There is no tenderness.   Musculoskeletal: He exhibits no edema.   Right wrist with bandage, CDI, no hematoma, erythema, tenderness. ROM intact, sensation intact   Neurological: He is alert and oriented to person, place, and time. No cranial nerve deficit.   Skin: Skin is warm and dry. No pallor.   Psychiatric: Judgment normal.   Nursing note and vitals reviewed.      Lab Review  Lab Results   Component Value Date/Time    WBC 8.5 04/03/2019 12:17 AM    RBC 5.43 04/03/2019 12:17 AM    HEMOGLOBIN 15.4 04/03/2019 12:17 AM    HEMATOCRIT 47.5 04/03/2019 12:17 AM    MCV 87.5 04/03/2019 12:17 AM    MCH 28.4 04/03/2019 12:17 AM    MCHC 32.4 (L) 04/03/2019 12:17 AM    MPV 8.4 (L) 04/03/2019 12:17 AM      Lab Results   Component Value Date/Time    SODIUM 136 04/03/2019 12:17 AM    POTASSIUM 3.9 04/03/2019 12:17 AM    CHLORIDE 106 04/03/2019 12:17 AM    CO2 24 04/03/2019 12:17 AM    GLUCOSE 89 04/03/2019 12:17 AM    BUN 16 04/03/2019 12:17 AM    CREATININE 1.03 04/03/2019 12:17 AM      Lab Results   Component Value Date/Time    ASTSGOT 23 04/01/2019 07:48 AM    ALTSGPT 35 04/01/2019 07:48 AM     Lab Results   Component Value Date/Time    CHOLSTRLTOT 212 (H) 04/01/2019 02:09 AM     (H) 04/01/2019 02:09 AM    HDL 30 (A) 04/01/2019 02:09 AM    TRIGLYCERIDE 120 04/01/2019 02:09 AM    TROPONINI 2.52 (H) 03/31/2019 11:33 PM             Cardiac Imaging and Procedures Review  EKG:  My personal interpretation of the EKG dated 4/2/19 post cath is sinus rhythm, asymmetrical t waves in V2-V3, t wave inversion in inferior leads, unchanged from last ECG    Transthoracic Echocardiogram:  4/2/19  CONCLUSIONS  Left ventricular ejection fraction is visually estimated to be 55%.  Mild concentric left ventricular hypertrophy.  No significant valve disease or flow abnormalities.   Right ventricular systolic pressure is estimated to be 35 mmHg.  Normal inferior vena cava size and inspiratory  collapse.  No prior study is available for comparison.     Cardiac Catheterization:  4/2/19  1.  Non-ST segment elevation myocardial infarction secondary to 95% stenosis in the mid left circumflex artery  2.  70-80% stenosis in the apical portion the left anterior descending artery  3.  Normal left ventricular systolic function and wall motion.  Ejection fraction about 55-60%  4.  Status post stenting of the left circumflex artery with 3 x 12 mm Synergy drug-eluting stent with no residual stenosis KASSANDRA-3 flow      Assessment/Plan  NSTEMI s/p Synergy BROOKE to mid left circumflex  CAD  70-80% stenosis of apical portion of LAD  - Aspirin 81  - Prasugrel 10mg daily  - Atorvastatin 80  - metoprolol 25mg BID    Dyslipidemia  -goal, decrease LDL <50% (to 75), may require non-statin medications to do so    Hypertension, uncontrolled  -150s over 24hrs.   -recommended amlodipine 5mg and lisinopril 5mg, he initially declines, stating blood pressure is always controlled at home, will follow up with cardiology as outpatient for monitoring  -Discussed need for risk factor modification given his recent cardiac event event, he understands that controlling blood pressure is paramount in his future cardiac health.  - at this time he is requesting Rx for BP meds, this will be provided at discharge.     DM Screen  -HBA1c pending    Thank you for allowing me to participate in the care of this patient. He is appropriate for discharge  Cardiology will sign off on this patient  Staffed with Dr. Ronald Brown

## 2019-04-03 NOTE — DISCHARGE SUMMARY
Discharge Summary    CHIEF COMPLAINT ON ADMISSION  No chief complaint on file.      Reason for Admission  Elevated Troponin     Admission Date  3/31/2019    CODE STATUS  Full Code    HPI & HOSPITAL COURSE    Patient is a pleasant 63 year old male with history of dyslipidemia and hypertension which she has been managing with lifestyle changes, who presented with complaints of chest pain which began on March 26th. Associated with malaise, he went to [facility] in [place] where he was noted to have elevated troponin of 1.47, no acute ischemic changes on his ECG and he was subsequently transferred to our facility.  He underwent a cardiac catheterization on 4/2/19 and 95% stenosis of the left circumflex was noted and a BROOKE was placed in this artery.  70-80% stenosis was also noted in the apical portion of the LAD.  Left ventricular function was found to be normal and EF was 55-60%.  He tolerated the procedure well and is asymptomatic at time of discharge.  He was referred to cardiac rehab and dietary and lifestyle modifications were discussed.  He will continue close outpatient follow up with cardiology      Therefore, he is discharged in fair and stable condition to home with close outpatient follow-up.    The patient met 2-midnight criteria for an inpatient stay at the time of discharge.    Discharge Date  4/3/19    FOLLOW UP ITEMS POST DISCHARGE  Cardiac rehab  Cardiolgy  Pcp    DISCHARGE DIAGNOSES  Principal Problem:    NSTEMI (non-ST elevated myocardial infarction) (HCC) POA: Unknown  Active Problems:    Essential hypertension POA: Unknown    Dyslipidemia POA: Unknown  Resolved Problems:    * No resolved hospital problems. *      FOLLOW UP  Future Appointments  Date Time Provider Department Center   4/12/2019 2:20 PM Jim Castellano SAVITA None     [facility]  [address]  [address]  [facility] [id number] [phone]  Call in 1 week  Your physician has referred you to Intensive Cardiac  Rehab. You cannot begin ICR for 2 weeks to allow time for your heart to heal. If you do not hear from ICR in about 1 week, please call them to schedule. Thank you!      MEDICATIONS ON DISCHARGE     Medication List      START taking these medications      Instructions   amLODIPine 5 MG Tabs  Commonly known as:  NORVASC   Doctor's comments:  Hold sbp less 110  Take 1 Tab by mouth every day.  Dose:  5 mg     aspirin 81 MG EC tablet  Start taking on:  4/4/2019   Take 1 Tab by mouth every day.  Dose:  81 mg     atorvastatin 80 MG tablet  Commonly known as:  LIPITOR   Take 1 Tab by mouth every evening.  Dose:  80 mg     lisinopril 5 MG Tabs  Commonly known as:  PRINIVIL   Doctor's comments:  Hold sbp less 105  Take 1 Tab by mouth every day.  Dose:  5 mg     metoprolol 25 MG Tabs  Commonly known as:  LOPRESSOR   Doctor's comments:  Hold sbp less 110 or hr less 50  Take 1 Tab by mouth 2 Times a Day.  Dose:  25 mg     prasugrel 10 MG Tabs  Start taking on:  4/4/2019  Commonly known as:  EFFIENT   Take 1 Tab by mouth every day.  Dose:  10 mg            Allergies  No Known Allergies    DIET  Orders Placed This Encounter   Procedures   • Diet Order Cardiac     Standing Status:   Standing     Number of Occurrences:   1     Order Specific Question:   Diet:     Answer:   Cardiac [6]       ACTIVITY  As tolerated.    CONSULTATIONS  Cardiology    PROCEDURES  EC-ECHOCARDIOGRAM COMPLETE W/O CONT   Final Result      OUTSIDE IMAGES-DX CHEST   Final Result      CL-LEFT HEART CATHETERIZATION WITH POSSIBLE INTERVENTION    (Results Pending)         LABORATORY  Lab Results   Component Value Date    SODIUM 136 04/03/2019    POTASSIUM 3.9 04/03/2019    CHLORIDE 106 04/03/2019    CO2 24 04/03/2019    GLUCOSE 89 04/03/2019    BUN 16 04/03/2019    CREATININE 1.03 04/03/2019        Lab Results   Component Value Date    WBC 8.5 04/03/2019    HEMOGLOBIN 15.4 04/03/2019    HEMATOCRIT 47.5 04/03/2019    PLATELETCT 249 04/03/2019        Total time of  the discharge process exceeds 45 minutes.

## 2019-04-03 NOTE — PROGRESS NOTES
Pt requested to speak to cardiology regarding clarification on BP meds. Dr. Jim Castellano clarified medications with patient and provided new written instructions regarding dosages and how to take medication. These changes have been hand written to reflect the new changes in both copies of discharge instructions.

## 2019-04-03 NOTE — PROGRESS NOTES
Bedside report received by day SHALONDA García. Patient sitting up in bed watching TV at this time, family present. POC discussed, verbalized understanding. No immediate concerns for patient at this time. All safety measures in place.

## 2019-04-03 NOTE — PROGRESS NOTES
[facility]    Brought effient to pt bedside but pt refuses Meds to Beds.  Prefers to  medication at his home pharmacy as our pharmacy is not contracted with his prescription insurance.  Provided education and answered questions about DAPT.    Jimmy Navarrete, PharmD

## 2019-04-03 NOTE — OP REPORT
Cardiac catheterization report    Procedure date: 4/2/2019    Pre-operative Diagnosis:  Non-ST segment elevation myocardial infarction    Post-operative Diagnosis:   1.  Non-ST segment elevation myocardial infarction secondary to 95% stenosis in the mid left circumflex artery  2.  70-80% stenosis in the apical portion the left anterior descending artery  3.  Normal left ventricular systolic function and wall motion.  Ejection fraction about 55-60%  4.  Status post stenting of the left circumflex artery with 3 x 12 mm Synergy drug-eluting stent with no residual stenosis KASSANDRA-3 flow    Procedure:  1.  Left heart catheterization with left ventriculography  2.  Selective coronary angiography  3.  Percutaneous coronary intervention the left circumflex artery  4.  Supervision of moderate conscious sedation    Complications: None    Description of Procedure:  After informed consent was obtained, the patient was brought to cardiac catheterization laboratory in fasting state.   Noel test was carried out on the right hand and was found to be negative.  Right wrist and right groin were then prepped and drapped in sterile fashion.  Versed and fentanyl were used for conscious sedation.  Lidocaine 2% was used to anesthetize the area.  A 6 Gabonese Terumo sheath was then placed in the right radial artery using Seldinger technique.  A 2.5 mg of verapamil, 100 microgram of nitroglycerine and 3000 units of heparin were administered into the radial sheath.    Selective angiography of the right coronary artery was performed in multiple views using 5 Gabonese TIG catheter.   Selective coronary angiography of the left coronary artery was then performed using the same catheter.   The catheter was then advanced into the left ventricle.    Left ventricular pressure was then recorded.   Left ventriculography was performed using 16 cc of contrast injected over 2 seconds. Left heart pullback was then performed.  The catheter was subsequently  removed.     After reviewing of the diagnostic angiography, it was felt that intervention of the left circumflex artery was indicated.  Bivalrudin was then started for anticoagulation.  A 6 Mauritanian EBU 3.5 guide catheter was used.  A 0.014 BMW wire was then advanced pass the stenosis.  The lesion was dilated with a 2.5 x 12 mm balloon.  A 3 x 12 mm Synergy drug eluting stent was deployed and post dilated with 3 x 12 mm non-compliance balloon upto 12 ATMs.  Subsequent angiography showed no significant residual stenosis with KASSANDRA III flow.   The guide wire was subsequently removed and final angiography was performed.  It confirmed good result. The guide catheter was then removed.   The radial sheath was subsequently removed.  Hemostasis was obtained using a Terumo TR wrist band.  The patient was then given a loading dose of Effient.  Bivalirudin was subsequently reduced to low dose infusion.  The patient tolerated procedure well and left cardiac catheterization laboratory in stable condition.    I supervised monitoring the patient under moderate conscious sedation beginning at 4:04 PM until the end of the case at 4:40 PM.    Findings:  There is no gradient across aortic valve.  Left ventricular end-diastolic pressure was 15 mmHg.    Left ventriculography showed normal wall motion.  Overall LV systolic function is normal .  Ejection fraction is calculated to be 55-60 %.    Coronary artery disease    This is right dominant system.    Left main is large and without flow limiting disease.  It bifurcated into left anterior descending and left circumflex artery.     Left anterior descending artery is large caliber vessel and extends to the apex.  It gives rises to 2 medium size diagonal branches.  There was 70-80% stenosis at the apical portion of the left anterior descending artery (LAD). he antegrade flow is normal.    Left circumflex artery is large in caliber.   It gives rise to small first and large second obtuse  marginal branches.  There was a 95% stenosis in the mid portion of the left circumflex artery (LCX).  The antegrade flow is normal.    Right coronary artery (RCA) is large caliber. It gives rise to several medium sized acute marginal branches, posterior descending artery and posterolateral branch.  There is no significant disease in the right coronary artery or its major branches.  The antegrade flow is normal.    After intervention, there was 0% stenosis in left circumflex artery with KASSANDRA III flow.      Plans;  Dual antiplatelet therapy for one year.   Continue low-dose bivalirudin infusion for 4 hours.   Risk factor modification.  Limit right wrist movement for 24 hours.      Terri London M.D.

## 2019-04-03 NOTE — DISCHARGE INSTRUCTIONS
Discharge Instructions    Discharged to home by car with relative. Discharged via wheelchair, hospital escort: Yes.  Special equipment needed: Not Applicable    Be sure to schedule a follow-up appointment with your primary care doctor or any specialists as instructed.     Discharge Plan:   Diet Plan: Discussed  Activity Level: Discussed  Confirmed Follow up Appointment: Appointment Scheduled  Confirmed Symptoms Management: Discussed  Medication Reconciliation Updated: Yes  Pneumococcal Vaccine Administered/Refused: Not given - Patient refused pneumococcal vaccine  Influenza Vaccine Indication: Patient Refuses    I understand that a diet low in cholesterol, fat, and sodium is recommended for good health. Unless I have been given specific instructions below for another diet, I accept this instruction as my diet prescription.   Other diet: Cardiac     Special Instructions: Diagnosis:  Acute Coronary Syndrome (ACS) is a diagnosis that encompasses cardiac-related chest pain and heart attack. ACS occurs when the blood flow to the heart muscle is severely reduced or cut off completely due to a slow process called atherosclerosis.  Atherosclerosis is a disease in which the coronary arteries become narrow from a buildup of fat, cholesterol, and other substances that combine to form plaque. If the plaque breaks, a blood clot will form and block the blood flow to the heart muscle. This lack of blood flow can cause damage or death to the heart muscle which is called a heart attack or Myocardial Infarction (MI). There are two different types of MIs:  ST Elevation Myocardial Infarction or STEMI (the most severe type of heart attack) and Non-ST Elevation Myocardial Infarction or NSTEMI.    Treatment Plan:  · Cardiac Diet  - Low fat, low salt, low cholesterol   · Cardiac Rehab  - Your doctor has ordered you a referral to [facility].  Call [phone] to schedule an appointment.  · Attend my follow-up appointment with my  Cardiologist.  · Take my medications as prescribed by my doctor  · Exercise daily  · Quit Smoking, Lower my bad cholesterol and raise my good cholesterol, lower my blood pressure and Reduce stress    Medications:  Certain medications are used to treat ACS.  Remember to always take medications as prescribed and never stop talking medications unless told by your doctor.    You have been prescribed the following medicatons:    Aspirin - Aspirin is used as a blood thinning medication and you will require this medication indefinitely.  Anti-platelet/blood thinner - Your Anti-platelet/Blood thinning medication is called Prasugrel, and is used in combination with aspirin to prevent clots from forming in your heart and/or around your stent.  Your doctor will determine how long you need to be on this medicine.  Beta-Blocker - Beta-Blocker metoprolol is used to lower blood pressure and heart rate, and/or helps your heart heal after a heart attack.  Statin - Statin atorvastatin is used to lower cholesterol.    · Is patient discharged on Warfarin / Coumadin?   No     Coronary Angiogram With Stent, Care After  Refer to this sheet in the next few weeks. These instructions provide you with information about caring for yourself after your procedure. Your health care provider may also give you more specific instructions. Your treatment has been planned according to current medical practices, but problems sometimes occur. Call your health care provider if you have any problems or questions after your procedure.  WHAT TO EXPECT AFTER THE PROCEDURE   After your procedure, it is typical to have the following:  · Bruising at the catheter insertion site that usually fades within 1-2 weeks.  · Blood collecting in the tissue (hematoma) that may be painful to the touch. It should usually decrease in size and tenderness within 1-2 weeks.  HOME CARE INSTRUCTIONS  · Take medicines only as directed by your health care provider. Blood thinners  may be prescribed after your procedure to improve blood flow through the stent.  · You may shower 24-48 hours after the procedure or as directed by your health care provider. Remove the bandage (dressing) and gently wash the catheter insertion site with plain soap and water. Pat the area dry with a clean towel. Do not rub the site, because this may cause bleeding.  · Do not take baths, swim, or use a hot tub until your health care provider approves.  · Check your catheter insertion site every day for redness, swelling, or drainage.  · Do not apply powder or lotion to the site.  · Do not lift over 10 lb (4.5 kg) for 5 days after your procedure or as directed by your health care provider.  · Ask your health care provider when it is okay to:  ¨ Return to work or school.  ¨ Resume usual physical activities or sports.  ¨ Resume sexual activity.  · Eat a heart-healthy diet. This should include plenty of fresh fruits and vegetables. Meat should be lean cuts. Avoid the following types of food:  ¨ Food that is high in salt.  ¨ Canned or highly processed food.  ¨ Food that is high in saturated fat or sugar.  ¨ Fried food.  · Make any other lifestyle changes as recommended by your health care provider. These may include:  ¨ Not using any tobacco products, including cigarettes, chewing tobacco, or electronic cigarettes. If you need help quitting, ask your health care provider.  ¨ Managing your weight.  ¨ Getting regular exercise.  ¨ Managing your blood pressure.  ¨ Limiting your alcohol intake.  ¨ Managing other health problems, such as diabetes.  · If you need an MRI after your heart stent has been placed, be sure to tell the health care provider who orders the MRI that you have a heart stent.  · Keep all follow-up visits as directed by your health care provider. This is important.  SEEK MEDICAL CARE IF:  · You have a fever.  · You have chills.  · You have increased bleeding from the catheter insertion site. Hold pressure on  the site.  SEEK IMMEDIATE MEDICAL CARE IF:  · You develop chest pain or shortness of breath, feel faint, or pass out.  · You have unusual pain at the catheter insertion site.  · You have redness, warmth, or swelling at the catheter insertion site.  · You have drainage (other than a small amount of blood on the dressing) from the catheter insertion site.  · The catheter insertion site is bleeding, and the bleeding does not stop after 30 minutes of holding steady pressure on the site.  · You develop bleeding from any other place, such as from your rectum. There may be bright red blood in your urine or stool, or it may appear as black, tarry stool.     This information is not intended to replace advice given to you by your health care provider. Make sure you discuss any questions you have with your health care provider.     Document Released: 07/07/2006 Document Revised: 01/08/2016 Document Reviewed: 07/06/2015  [employer] Interactive Patient Education ©2016 [employer]        Hypertension  Hypertension is another name for high blood pressure. High blood pressure forces your heart to work harder to pump blood. A blood pressure reading has two numbers, which includes a higher number over a lower number (example: 110/72).  Follow these instructions at home:  · Have your blood pressure rechecked by your doctor.  · Only take medicine as told by your doctor. Follow the directions carefully. The medicine does not work as well if you skip doses. Skipping doses also puts you at risk for problems.  · Do not smoke.  · Monitor your blood pressure at home as told by your doctor.  Contact a doctor if:  · You think you are having a reaction to the medicine you are taking.  · You have repeat headaches or feel dizzy.  · You have puffiness (swelling) in your ankles.  · You have trouble with your vision.  Get help right away if:  · You get a very bad headache and are confused.  · You feel weak, numb, or faint.  · You get chest or belly  (abdominal) pain.  · You throw up (vomit).  · You cannot breathe very well.  This information is not intended to replace advice given to you by your health care provider. Make sure you discuss any questions you have with your health care provider.  Document Released: 06/05/2009 Document Revised: 05/25/2017 Document Reviewed: 10/10/2014  [employer] Interactive Patient Education © 2017 [employer]        Depression / Suicide Risk    As you are discharged from this [facility] facility, it is important to learn how to keep safe from harming yourself.    Recognize the warning signs:  · Abrupt changes in personality, positive or negative- including increase in energy   · Giving away possessions  · Change in eating patterns- significant weight changes-  positive or negative  · Change in sleeping patterns- unable to sleep or sleeping all the time   · Unwillingness or inability to communicate  · Depression  · Unusual sadness, discouragement and loneliness  · Talk of wanting to die  · Neglect of personal appearance   · Rebelliousness- reckless behavior  · Withdrawal from people/activities they love  · Confusion- inability to concentrate     If you or a loved one observes any of these behaviors or has concerns about self-harm, here's what you can do:  · Talk about it- your feelings and reasons for harming yourself  · Remove any means that you might use to hurt yourself (examples: pills, rope, extension cords, firearm)  · Get professional help from the community (Mental Health, Substance Abuse, psychological counseling)  · Do not be alone:Call your Safe Contact- someone whom you trust who will be there for you.  · Call your local CRISIS HOTLINE 162-1181 or 216-443-2693  · Call your local Children's Mobile Crisis Response Team [place] (452) 169-9545 or www.[employer]  · Call the toll free National Suicide Prevention Hotlines   · National Suicide Prevention Lifeline 983-372-LRVI (4597)  · National igobubble Line Network  800-SUICIDE (775-8178)

## 2019-04-03 NOTE — PROGRESS NOTES
Cardiovascular Nurse Navigator ([phone]) Note:    Angiogram with PCI on 4/2. EF per echocardiogram is 55%. No HF diagnosis.    Reviewed ACS medications:  · DAPT: aspirin + prasugrel  · Beta-Blocker:  lopressor, this is fine as there is no LVSD  · Statin:  atorva 80  · Consider for aldosterone blockade?  no -- EF is 55%  · Consider for ACE-I/ARB/ARNI?  no -- EF is 55%    Meds to Beds BEDSIDE NURSING RESPONSIBILITIES:    Please initiate Meds to Beds for dual antiplatelet therapy:     1. Obtain outpatient order for P2Y12 inhibitor (ticagrelor, clopidogrel, prasugrel) from physician   2. Call [phone] (or, if after hours/weekend, [phone] and request 'on-call anti-coag pharmacist') patient should have med in hand at time of discharge.    Intensive Cardiac Rehab (ICR) Referral:  Referred on 4/2/19; has current inpatient orders for nutrition consult & PT for Phase I ICR    Demographics  Patient resides in: [place], [place]  Insurance: Medicare and [place]    Inpatient & Discharge Patient Education:  Bedside nursing to continually provide patient education on ACS meds, signs and symptoms to monitor for, and risk factor modification.     Also at discharge please complete the “Acute Coronary Syndrome” special instructions on the AVS.          Follow up care    Follow-up With  Details  Why  Contact Info   [facility]  Call in 1 week  Your physician has referred you to Intensive Cardiac Rehab. You cannot begin ICR for 2 weeks to allow time for your heart to heal. If you do not hear from ICR in about 1 week, please call them to schedule. Thank you!  [address]  [address]  [facility] [address] [phone]       Spoke with hospital Porsha she will arrange cardiology follow up.    Thank you and please call with questions.

## 2019-04-04 NOTE — THERAPY
Pt is s/p PCI with elevated troponins presenting to PT cardiac rehab phase I eval. Pt and spouse appearing receptive to education given regarding HR goals, Talk Test, RPE scale, Home walking program. energy conservation/pacing, and weight monitoring. He gave an RPE of 6 at rest and a 13 post-stairs. BP dropped from 147/83mmHg to 138/74mmHg post-gait, and further to 111/59mmHg after stairs. Pt verbalized understanding of importance of monitoring his exertion. At this time, pt does not require further acute PT intervention. Please refer to OP cardiac rehab when in appropriate stage of recovery.

## 2019-04-04 NOTE — PROGRESS NOTES
Pt dc'd home. IV and monitor removed; monitor room notified. Pt left unit via walking with wife. Personal belongings with pt when leaving unit. Pt given discharge instructions prior to leaving unit including prescription and when to visit with physician; verbalizes understanding. Copy of discharge instructions with pt and in the chart.

## 2019-04-05 ENCOUNTER — TELEPHONE (OUTPATIENT)
Dept: CARDIOLOGY | Facility: MEDICAL CENTER | Age: 64
End: 2019-04-05

## 2019-04-05 NOTE — TELEPHONE ENCOUNTER
med questions   Received: Today   Message Contents   Toryfelecia Shukla, Med Ass't  Shannancasimiro Chadwick R.N.   Phone Number: [phone]             Pt was seen in the hospital by LUCINDA & states he has questions on the med she prescribed.   # [phone]      Returned patient call. Pt states his BP has been ranging from 119-105. Pt took his BP 6 x yesterday. We discuss this may be excessive. He asks if its a problem. I explain that the APRN advisement was pribably intended to be 1 hr after AM meds. Discussed beta blockers and frequent pt reported symptoms.  Pt also reports diarrhea, better today.Pt feeling faint., fatigued. Dehydration also discussed. 2L hydration. Pt developed SOB yesterday with exertion. Pt is not SOB now and states it goes away with rest. Pts weight 197 today, on par with departure weight. No swelling. ER precautions discussed. Declines an appointment to be seen today. Pt wants reassurance overall. Pt does not have My Chart secondary to SS# request. Pt welcomed to call on Monday with status.

## 2022-06-27 NOTE — PROGRESS NOTES
Patient Education        Depression Treatment: Care Instructions  Your Care Instructions     Depression is a condition that affects the way you feel, think, and act. It causes symptoms such as low energy, loss of interest in daily activities, and sadness or grouchiness that goes on for a long time. Depression is very commonand affects men and women of all ages. Depression is a medical illness caused by changes in the natural chemicals in your brain. It is not a character flaw, and it does not mean that you are a bador weak person. It does not mean that you are going crazy. It is important to know that depression can be treated. Medicines, counseling, and self-care can all help. Many people do not get help because they are embarrassed or think that they will get over the depression on their own. Butsome people do not get better without treatment. Follow-up care is a key part of your treatment and safety. Be sure to make and go to all appointments, and call your doctor if you are having problems. It's also a good idea to know your test results and keep alist of the medicines you take. How can you care for yourself at home? Learn about antidepressant medicines  Antidepressant medicines can improve or end the symptoms of depression. You may need to take the medicine for at least 6 months, and often longer. Keep taking your medicine even if you feel better. If you stop taking it too soon, yoursymptoms may come back or get worse. You may start to feel better within 1 to 3 weeks of taking antidepressant medicine. But it can take as many as 6 to 8 weeks to see more improvement. Talk to your doctor if you have problems with your medicine or if you do not noticeany improvement after 3 weeks. Antidepressants can make you feel tired, dizzy, or nervous. Some people have dry mouth, constipation, headaches, sexual problems, an upset stomach, or diarrhea.  Many of these side effects are mild and go away on their own after you Patient is a direct admit from [facility], patient of Dr. Green for an elevated troponin.  Dr. Mercer is accepting the patient, Dr. Vann with Cardiology will consult.  ADT order signed and held in [facility] by MD on 3/31.  Held orders to be released upon patients arrival to unit.    Version: 13.3  © 1677-3853 Healthwise, [employer]. Care instructions adapted under license by [facility] ([facility]). If you have questions about a medical condition or this instruction, always ask your healthcare professional. [address] any warranty or liability for your use of this information.

## 2022-10-04 DIAGNOSIS — I25.119 CORONARY ARTERY DISEASE WITH ANGINA PECTORIS, UNSPECIFIED VESSEL OR LESION TYPE, UNSPECIFIED WHETHER NATIVE OR TRANSPLANTED HEART (HCC): Primary | ICD-10-CM

## 2022-10-10 ENCOUNTER — HOSPITAL ENCOUNTER (OUTPATIENT)
Dept: RADIOLOGY | Facility: MEDICAL CENTER | Age: 67
End: 2022-10-10
Attending: NURSE PRACTITIONER
Payer: MEDICARE

## 2022-10-10 PROCEDURE — 93017 CV STRESS TEST TRACING ONLY: CPT

## 2022-10-10 PROCEDURE — 93018 CV STRESS TEST I&R ONLY: CPT | Performed by: INTERNAL MEDICINE
